# Patient Record
Sex: MALE
[De-identification: names, ages, dates, MRNs, and addresses within clinical notes are randomized per-mention and may not be internally consistent; named-entity substitution may affect disease eponyms.]

---

## 2024-06-03 ENCOUNTER — APPOINTMENT (OUTPATIENT)
Dept: UROLOGY | Facility: CLINIC | Age: 64
End: 2024-06-03
Payer: MEDICAID

## 2024-06-03 VITALS
HEART RATE: 76 BPM | OXYGEN SATURATION: 96 % | HEIGHT: 69.02 IN | SYSTOLIC BLOOD PRESSURE: 120 MMHG | RESPIRATION RATE: 16 BRPM | DIASTOLIC BLOOD PRESSURE: 83 MMHG | BODY MASS INDEX: 28.88 KG/M2 | TEMPERATURE: 98.1 F | WEIGHT: 195 LBS

## 2024-06-03 DIAGNOSIS — N48.9 DISORDER OF PENIS, UNSPECIFIED: ICD-10-CM

## 2024-06-03 PROBLEM — Z00.00 ENCOUNTER FOR PREVENTIVE HEALTH EXAMINATION: Status: ACTIVE | Noted: 2024-06-03

## 2024-06-03 PROCEDURE — 99204 OFFICE O/P NEW MOD 45 MIN: CPT

## 2024-06-03 RX ORDER — CLOTRIMAZOLE AND BETAMETHASONE DIPROPIONATE 10; .5 MG/G; MG/G
1-0.05 CREAM TOPICAL TWICE DAILY
Qty: 1 | Refills: 3 | Status: ACTIVE | COMMUNITY
Start: 2024-06-03 | End: 1900-01-01

## 2024-06-03 RX ORDER — LOSARTAN POTASSIUM 50 MG/1
50 TABLET, FILM COATED ORAL
Refills: 0 | Status: ACTIVE | COMMUNITY

## 2024-06-03 RX ORDER — METFORMIN HYDROCHLORIDE 1000 MG/1
1000 TABLET, COATED ORAL
Refills: 0 | Status: ACTIVE | COMMUNITY

## 2024-06-03 RX ORDER — TAMSULOSIN HYDROCHLORIDE 0.4 MG/1
0.4 CAPSULE ORAL
Refills: 0 | Status: ACTIVE | COMMUNITY

## 2024-06-03 RX ORDER — ATORVASTATIN CALCIUM 20 MG/1
20 TABLET, FILM COATED ORAL
Refills: 0 | Status: ACTIVE | COMMUNITY

## 2024-06-03 RX ORDER — MICONAZOLE NITRATE AND TOLNAFTATE 2 %-1 %
2-1 KIT TOPICAL
Refills: 0 | Status: ACTIVE | COMMUNITY

## 2024-06-03 RX ORDER — UBIDECARENONE 200 MG
200 CAPSULE ORAL
Refills: 0 | Status: ACTIVE | COMMUNITY

## 2024-06-03 NOTE — HISTORY OF PRESENT ILLNESS
[FreeTextEntry1] :  ELJOI AGDAY Sep  3 1960   Language: English  Date of First visit: 06/03/2024 Accompanied by: self  Contact info:  Referring Provider/PCP: Dr. Mancia Fax:    CC/ Problem List: penile lesions difficulty to void =============================================================================== FIRST VISIT / Summary: Very pleasant 63 year old M here for penile lesion x3 months. Was prescribed miconazole cream that was not helpful. He presented at [facility] for inability to void and he was DC same day after receiving IVF and lidocaine for his anal fissure. No saeed catheter was placed due to ability to void after IVF. Drinks 1 L of water daily  IPSS 19 QOL 5 ------------------------------------------------------------------------------------------- INTERVAL VISITS:   ===============================================================================   PMH: DM, BPH, HTN, cholesterol Meds: tamsulosin 0.4 mg, metformin, losartan, atorvastatin, Coq10 All: denies FHx: no  malignancies SocHx: non smoker, no Etoh, , 3 children,    PSH: denies   ROS: Review of Systems is as per HPI unless otherwise denoted below   =============================================================================== DATA: LABS (SELECTED):---------------------------------------------------------------------------- 3/7/2024 A1c 7.1, PSA 3.5, BUN/Cr 17/1.01 5/25/2024 BUN/Cr 15/1.54, UA negative RBC 0-3    RADS:-------------------------------------------------------------------------------------------------------------------     PATHOLOGY/CYTOLOGY:-------------------------------------------------------------------------------------------     VOIDING STUDIES: --------------------------------------------------------------------------- 06/03/2024 random PVR 94   STONE STUDIES: (Analysis/LLSA)----------------------------------------------------------------------------------     PROCEDURES: -----------------------------------------------------------------------------------------------       =============================================================================== PHYSICAL EXAM:    FOCUSED: ----------------------------------------------------------------------------------  Penis: No tenderness, curvature, or plaques. circumcised. 4 circular annular 6mm lesions, keratinizing, noted primarily corona of glans. One at frenulum Meatus: normal caliber Testes: Descended bilaterally. Non tender, no palpable masses Epididymi: No palpable epididymal masses Scrotum: Scrotal wall normal. No spermatic cord mass. No palpable hydroceles    ======================================================================================= DISCUSSION: ======================================================================================= ASSESSMENT and PLAN   1. elevated PSA -UA/UCx -repeat PSA -He showed trend that he usually is around 3.5-4 for last 3+ years  2. elevated creatinine  -advised increase hydration, he dehydrates due to his work as a     =======================================================================================  4 Thank you for allowing me to assist in the care of your patient. Should you have any questions please do not hesitate to reach out to me.     Vickey Hatfield MD                                                        [facility] Physician [facility]   [place] Office: [address], [address] [place] [address] T: [phone] F: [phone]   [place] Office: [address], 1[address] [place] [address] T: [phone] F: [phone]

## 2024-06-04 LAB
C TRACH RRNA SPEC QL NAA+PROBE: NOT DETECTED
HAV IGM SER QL: NONREACTIVE
HBV SURFACE AG SER QL: NONREACTIVE
HCV AB SER QL: NONREACTIVE
HCV S/CO RATIO: 0.06 S/CO
N GONORRHOEA RRNA SPEC QL NAA+PROBE: NOT DETECTED
SOURCE AMPLIFICATION: NORMAL
SOURCE AMPLIFICATION: NORMAL
T PALLIDUM AB SER QL IA: NEGATIVE
T VAGINALIS RRNA SPEC QL NAA+PROBE: NOT DETECTED

## 2024-06-17 LAB
HSV 1 IGG TYPE-SPECIFIC AB: 47.2 INDEX
HSV 2 IGG TYPE-SPECIFIC AB: <0.9 INDEX

## 2024-07-05 ENCOUNTER — APPOINTMENT (OUTPATIENT)
Dept: UROLOGY | Facility: CLINIC | Age: 64
End: 2024-07-05